# Patient Record
Sex: FEMALE | Race: BLACK OR AFRICAN AMERICAN | NOT HISPANIC OR LATINO | Employment: UNEMPLOYED | URBAN - METROPOLITAN AREA
[De-identification: names, ages, dates, MRNs, and addresses within clinical notes are randomized per-mention and may not be internally consistent; named-entity substitution may affect disease eponyms.]

---
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
## 2019-04-05 PROBLEM — J45.909 ASTHMA: Status: ACTIVE | Noted: 2019-04-05

## 2019-04-05 PROBLEM — L30.9 ECZEMA: Status: ACTIVE | Noted: 2019-04-05

## 2019-04-10 PROBLEM — A74.9 CHLAMYDIA: Status: ACTIVE | Noted: 2019-04-10
